# Patient Record
Sex: MALE | Race: OTHER | Employment: OTHER | URBAN - METROPOLITAN AREA
[De-identification: names, ages, dates, MRNs, and addresses within clinical notes are randomized per-mention and may not be internally consistent; named-entity substitution may affect disease eponyms.]

---

## 2021-02-01 NOTE — PATIENT DISCUSSION
The patient was informed that with [address] [facility] for distance, they will need glasses for all near and intermediate activities after surgery. The patient understands there is a possibility they may need an enhancement after surgery. The patient elects [employer] OD, goal of emmetropia.

## 2021-02-08 NOTE — PATIENT DISCUSSION
The patient was informed that with [address] [facility] for distance, they will need glasses for all near and intermediate activities after surgery. The patient understands there is a possibility they may need an enhancement after surgery. The patient elects [employer] OD, goal of emmetropia.

## 2021-02-12 NOTE — PATIENT DISCUSSION
The patient was informed that with [address] [facility] for distance, they will need glasses for all near and intermediate activities after surgery. The patient understands there is a possibility they may need an enhancement after surgery. The patient elects [employer] OD, goal of emmetropia.

## 2021-02-12 NOTE — PATIENT DISCUSSION
Patient advised of the right to post-operative care by the surgeon. Patient is fully informed of, and agreed to, co-management with their primary optometric physician. Post-operative care by the surgeon is not medically necessary and co-management is clinically appropriate. Patient has received itemization of fees related to cataract surgery. Transfer of care letter completed for the patient. Transfer care of the Right eye to Dr. Estela Johnson on 02/12/2021. Patient instructed to call immediately if any new distortion, blurring, decreased vision or eye pain.

## 2021-02-12 NOTE — PATIENT DISCUSSION
The patient was informed that with [address] [facility] for distance, they will need glasses for all near and intermediate activities after surgery. The patient understands there is a possibility they may need an enhancement after surgery. The patient elects [employer] OD, goal of emmetropia.

## 2021-02-12 NOTE — PATIENT DISCUSSION
The patient was informed that with [address] [facility] for distance, they will need glasses for all near and intermediate activities after surgery. The patient understands there is a possibility they may need an enhancement after surgery. The patient elects [employer] OD, goal of emmetropia.

## 2021-02-17 NOTE — PATIENT DISCUSSION
Patient advised of the right to post-operative care by the surgeon. Patient is fully informed of, and agreed to, co-management with their primary optometric physician. Post-operative care by the surgeon is not medically necessary and co-management is clinically appropriate. Patient has received itemization of fees related to cataract surgery. Transfer of care letter completed for the patient. Transfer care of the Right eye to Dr. Helena Dumont on 02/12/2021. Patient instructed to call immediately if any new distortion, blurring, decreased vision or eye pain.

## 2021-02-17 NOTE — PATIENT DISCUSSION
Patient advised of the right to post-operative care by the surgeon. Patient is fully informed of, and agreed to, co-management with their primary optometric physician. Post-operative care by the surgeon is not medically necessary and co-management is clinically appropriate. Patient has received itemization of fees related to cataract surgery. Transfer of care letter completed for the patient. Transfer care of the Right eye to Dr. Rohit Fam on 02/12/2021. Patient instructed to call immediately if any new distortion, blurring, decreased vision or eye pain.

## 2021-02-17 NOTE — PATIENT DISCUSSION
Patient advised of the right to post-operative care by the surgeon. Patient is fully informed of, and agreed to, co-management with their primary optometric physician. Post-operative care by the surgeon is not medically necessary and co-management is clinically appropriate. Patient has received itemization of fees related to cataract surgery. Transfer of care letter completed for the patient. Transfer care of the Right eye to Dr. Leia Betancourt on 02/12/2021. Patient instructed to call immediately if any new distortion, blurring, decreased vision or eye pain.

## 2021-02-17 NOTE — PATIENT DISCUSSION
OK to proceed with IOL OS due to FD dec for Sx OS made today with [facility] and goal is for Custom OS, goal mary carmen.  pt ed expect to need NVO for ALL tasks at arm's length and in with CV MARY CARMEN Sx.

## 2021-10-18 ENCOUNTER — NEW PATIENT COMPREHENSIVE (OUTPATIENT)
Dept: URBAN - METROPOLITAN AREA CLINIC 32 | Facility: CLINIC | Age: 71
End: 2021-10-18

## 2021-10-18 DIAGNOSIS — H52.203: ICD-10-CM

## 2021-10-18 DIAGNOSIS — H52.03: ICD-10-CM

## 2021-10-18 DIAGNOSIS — H25.13: ICD-10-CM

## 2021-10-18 DIAGNOSIS — H52.4: ICD-10-CM

## 2021-10-18 PROCEDURE — 92004 COMPRE OPH EXAM NEW PT 1/>: CPT

## 2021-10-18 PROCEDURE — 92015 DETERMINE REFRACTIVE STATE: CPT

## 2021-10-18 ASSESSMENT — TONOMETRY
OD_IOP_MMHG: 11
OS_IOP_MMHG: 15
OD_IOP_MMHG: 12
OS_IOP_MMHG: 17

## 2021-10-18 ASSESSMENT — VISUAL ACUITY
OS_CC: J1
OS_SC: J16
OS_BAT: 20/50
OS_SC: 20/40
OD_CC: J1
OD_BAT: 20/50
OD_SC: 20/50
OU_SC: 20/30
OD_SC: J16

## 2021-10-18 ASSESSMENT — KERATOMETRY
OS_AXISANGLE2_DEGREES: 65
OD_AXISANGLE2_DEGREES: 90
OD_K1POWER_DIOPTERS: 45.50
OD_AXISANGLE_DEGREES: 180
OS_K2POWER_DIOPTERS: 45.00
OS_K1POWER_DIOPTERS: 45.25
OD_K2POWER_DIOPTERS: 45.25
OS_AXISANGLE_DEGREES: 155

## 2024-02-09 ENCOUNTER — COMPREHENSIVE EXAM (OUTPATIENT)
Dept: URBAN - METROPOLITAN AREA CLINIC 32 | Facility: CLINIC | Age: 74
End: 2024-02-09

## 2024-02-09 DIAGNOSIS — H02.833: ICD-10-CM

## 2024-02-09 DIAGNOSIS — H43.813: ICD-10-CM

## 2024-02-09 DIAGNOSIS — H57.813: ICD-10-CM

## 2024-02-09 DIAGNOSIS — H16.223: ICD-10-CM

## 2024-02-09 DIAGNOSIS — H35.363: ICD-10-CM

## 2024-02-09 DIAGNOSIS — H52.03: ICD-10-CM

## 2024-02-09 DIAGNOSIS — H25.13: ICD-10-CM

## 2024-02-09 DIAGNOSIS — D31.31: ICD-10-CM

## 2024-02-09 DIAGNOSIS — H02.836: ICD-10-CM

## 2024-02-09 PROCEDURE — 99214 OFFICE O/P EST MOD 30 MIN: CPT

## 2024-02-09 PROCEDURE — 92015 DETERMINE REFRACTIVE STATE: CPT

## 2024-02-09 PROCEDURE — 92250 FUNDUS PHOTOGRAPHY W/I&R: CPT

## 2024-02-09 ASSESSMENT — KERATOMETRY
OS_AXISANGLE2_DEGREES: 76
OS_AXISANGLE_DEGREES: 166
OD_K1POWER_DIOPTERS: 45.50
OD_AXISANGLE_DEGREES: 9
OD_AXISANGLE2_DEGREES: 99
OD_K2POWER_DIOPTERS: 45.25
OS_K1POWER_DIOPTERS: 45.25
OS_K2POWER_DIOPTERS: 45.00

## 2024-02-09 ASSESSMENT — TONOMETRY
OD_IOP_MMHG: 14
OS_IOP_MMHG: 12

## 2024-02-09 ASSESSMENT — VISUAL ACUITY
OS_GLARE: 20/40
OS_SC: 20/30
OD_SC: J5
OS_SC: J5
OD_GLARE: 20/40
OD_SC: 20/50

## 2025-03-06 ENCOUNTER — COMPREHENSIVE EXAM (OUTPATIENT)
Age: 75
End: 2025-03-06

## 2025-03-06 DIAGNOSIS — H02.833: ICD-10-CM

## 2025-03-06 DIAGNOSIS — H16.223: ICD-10-CM

## 2025-03-06 DIAGNOSIS — H43.813: ICD-10-CM

## 2025-03-06 DIAGNOSIS — H57.813: ICD-10-CM

## 2025-03-06 DIAGNOSIS — H02.836: ICD-10-CM

## 2025-03-06 DIAGNOSIS — H35.363: ICD-10-CM

## 2025-03-06 DIAGNOSIS — H25.13: ICD-10-CM

## 2025-03-06 PROCEDURE — 99214 OFFICE O/P EST MOD 30 MIN: CPT

## 2025-03-06 PROCEDURE — 92134 CPTRZ OPH DX IMG PST SGM RTA: CPT
